# Patient Record
Sex: FEMALE | Race: BLACK OR AFRICAN AMERICAN
[De-identification: names, ages, dates, MRNs, and addresses within clinical notes are randomized per-mention and may not be internally consistent; named-entity substitution may affect disease eponyms.]

---

## 2017-03-01 NOTE — [facility]
Bilateral digital screening mammograms with CAD: 

HISTORY 

Routine screening. 

COMPARISON 

Baseline exam. 

FINDINGS 

Breast density category B. 

The skin and nipples show no abnormalities. No abnormal lymph nodes are 

seen in the axilla. The breast parenchyma shows scattered fibroglandular 

density. There appears to be a nodular density in the 12 o'clock B 

position of the right breast which appears to measure approximately 1.2 

centimeters in size. This may represent a cyst but recommend further 

evaluation with ultrasound. There are no other dominant masses, suspicious

calcifications or architectural distortions. 

IMPRESSION 

12 millimeter nodule at the 12 o'clock B position of the right breast. 

Recommend further evaluation with ultrasound. 

This study was interpreted with the benefit of Computerized Aided 

Detection (CAD). 

Mammography is not 100% sensitive in detecting breast cancer. Therefore, a

self breast exam and a clinical breast exam are very important. A negative

mammogram does not negate a clinically suspicious finding and should not 

result in a delay in biopsying a clinically suspicious abnormality. 

BI-RADS category 0: Incomplete. Ultrasound followup is recommended. 

This patient's information has been entered into a reminder system for the

patient to be notified with the results of this examination and a target 

date for her next mammograms. 

 

Electronically signed by: Cammy Wynn MD (Mar 01, 2017 21:30:25)

## 2017-03-09 NOTE — RAD
EXAM: Right breast ultrasound.



HISTORY: Density on mammographic screening. Sonography is requested.



COMPARISON: Mammography 3/1/2017.



FINDINGS: Sonography of the right superior breast was performed at the site of

prior mammographic concern. There is no correlate for the nodular focus at the

12:00 position. Only normal parenchyma is seen at that site. Immediately deep

to the nipple, there is one prominent duct measuring 3.5 mm in diameter. There

is no intraductal mass.



IMPRESSION:

1. BI-RADS Category 3: Probably benign findings.

2. Recommend 6 month follow-up right mammography to confirm stability of a

density at the 12:00 position without sonographic correlate.

## 2017-08-29 NOTE — RAD
DATE: 8/29/2017



EXAM: DIGITAL DIAGNOSTIC RT



HISTORY: 6 month follow-up of asymmetric density within the right breast.



COMPARISON: 3/1/2017



This study was interpreted with the benefit of Computerized Aided Detection

(CAD).





The breast parenchyma is heterogeneously dense, which could reduce sensitivity

of mammography. Breast parenchyma level C.





FINDINGS: MLO and CC digital mammograms of the right breast were obtained.

Comparison study is dated 3/1/2017. The asymmetric density seen within the

right breast on the previous examination is less prominent on today's

mammogram. No spiculated mass is seen. No malignant appearing calcification is

noted.  





IMPRESSION: The asymmetric density is less prominent on today's mammogram. I

would recharacterize the patient's mammograms as a BI-RADS Category 1,

negative. No mammographic evidence of malignancy with a recommendation for

routine yearly screening mammography for follow-up.







BI-RADS CATEGORY: 1 NEGATIVE



RECOMMENDED FOLLOW-UP: 12M 12 MONTH FOLLOW-UP



PQRS compliance statement: Patient information was entered into a reminder

system with a target due date 3/1/2018 for the next mammogram.



Mammography is a sensitive method for finding small breast cancers, but it

does not detect them all and is not a substitute for careful clinical

examination.  A negative mammogram does not negate a clinically suspicious

finding and should not result in delay in biopsying a clinically suspicious

abnormality.



"Our facility is accredited by the [employer] Mammography

Program."

## 2019-02-19 NOTE — [facility]
Bilateral digital screening mammograms with 3-D tomosynthesis:

 

Reason for examination: Routine screening.

 

Comparison is made to previous studies dated 2/15/2018 and 3/1/2017.

 

Bilateral mammograms in CC and oblique projections were obtained with 2-D 

imaging and 3-D tomosynthesis imaging on a Siemens Inspiration unit and 

reviewed on the workstation. Interpretation was made with the benefit of 

CAD.

 

The skin and nipples show no abnormalities. No abnormal axillary lymph 

nodes are seen. The breast parenchyma is heterogeneously dense. (Breast 

density: Category C.) There are no dominant masses, suspicious 

calcifications or architectural distortion.

 

Impression:

 

No evidence of malignancy. Recommend routine screening.

 

Your patient's mammogram demonstrates that she has dense breast tissue 

(breast density category C or D), which could hide abnormalities, and if 

she has other risk factors for breast cancer that have been identified, 

she might benefit from supplemental screening tests that may be suggested 

by you as her ordering physician. Dense breast tissue, in and of itself, 

is a relatively common condition. Therefore, this information is not 

provided to cause undue concern, but rather to raise your awareness and to

promote discussion with your patient regarding the presence of other risk 

factors, in addition to dense breast tissue. Your patient's mammography 

results will be sent to her.

 

BI-RAD Category 2: Benign.

 

"Our facility is accredited by the [employer] 

Mammography Program."

 

This patient's information has been entered into a reminder system for the

patient to be notified with the results of her examination and a target 

date for the next mammogram.

 

Electronically signed by: Alona Wynn MD (2/19/2019 6:38 PM) [email or web address]

## 2020-07-02 NOTE — [facility]
Bilateral digital screening mammograms with 3-D tomosynthesis:

 

Reason for examination: Routine screening.

 

Comparison is made to previous studies dated back to 8/29/2017.

 

Bilateral mammograms in CC and oblique projections were obtained with 2-D 

imaging and 3-D tomosynthesis imaging on a Siemens Inspiration unit and 

reviewed on the workstation. Interpretation was made with the benefit of 

CAD.

 

The skin and nipples show no abnormalities. No abnormal axillary lymph 

nodes are seen. The breast parenchyma shows scattered fatty and 

fibroglandular density. (Breast density: Category B.) There are no 

dominant masses, suspicious calcifications or architectural distortion. 

 

Impression:

 

No evidence of malignancy. Recommend routine screening.

 

BI-RAD Category 1: Negative.

 

"Our facility is accredited by the [employer] 

Mammography Program."

 

This patient's information has been entered into a reminder system for the

patient to be notified with the results of her examination and a target 

date for the next mammogram.

 

Electronically signed by: Alona Wynn MD (7/2/2020 6:20 PM) [email or web address]

## 2022-01-03 ENCOUNTER — HOSPITAL ENCOUNTER (OUTPATIENT)
Dept: HOSPITAL 61 - KCIC MAMMO | Age: 47
End: 2022-01-03
Attending: FAMILY MEDICINE
Payer: COMMERCIAL

## 2022-01-03 DIAGNOSIS — Z12.31: Primary | ICD-10-CM

## 2022-01-03 PROCEDURE — 77067 SCR MAMMO BI INCL CAD: CPT

## 2022-01-03 PROCEDURE — 77063 BREAST TOMOSYNTHESIS BI: CPT

## 2022-01-03 NOTE — [facility]
Bilateral digital screening mammograms with 3-D tomosynthesis:



Reason for examination: Routine screening.



Comparison is made to previous studies dated back to 8/29/2017.



Bilateral mammograms in CC and oblique projections were obtained with 2-D imaging and 3-D tomosynthes
is imaging on a Siemens Inspiration unit and reviewed on the workstation. Interpretation was made wit
h the benefit of CAD.



The skin and nipples show no abnormalities. No abnormal axillary lymph nodes are seen. The breast par
enchyma shows scattered fatty and fibroglandular density. (Breast density: Category B.) There are no 
dominant masses, suspicious calcifications or architectural distortion. 



Impression:



No evidence of malignancy. Recommend routine screening.



BI-RAD Category 1: Negative.



"Our facility is accredited by the [employer] Mammography Program."



This patient's information has been entered into a reminder system for the patient to be notified wit
h the results of her examination and a target date for the next mammogram.



Electronically signed by: Alona Wynn MD (1/3/2022 5:21 PM) [email or web address]